# Patient Record
Sex: FEMALE | Race: WHITE | NOT HISPANIC OR LATINO | Employment: FULL TIME | URBAN - NONMETROPOLITAN AREA
[De-identification: names, ages, dates, MRNs, and addresses within clinical notes are randomized per-mention and may not be internally consistent; named-entity substitution may affect disease eponyms.]

---

## 2023-10-02 ENCOUNTER — OFFICE VISIT (OUTPATIENT)
Dept: OTOLARYNGOLOGY | Facility: CLINIC | Age: 53
End: 2023-10-02
Payer: COMMERCIAL

## 2023-10-02 VITALS — TEMPERATURE: 97.8 F | SYSTOLIC BLOOD PRESSURE: 129 MMHG | DIASTOLIC BLOOD PRESSURE: 66 MMHG | HEART RATE: 98 BPM

## 2023-10-02 DIAGNOSIS — H91.93 DECREASED HEARING OF BOTH EARS: ICD-10-CM

## 2023-10-02 DIAGNOSIS — H93.13 TINNITUS OF BOTH EARS: Primary | ICD-10-CM

## 2023-10-02 PROCEDURE — 99203 OFFICE O/P NEW LOW 30 MIN: CPT | Performed by: NURSE PRACTITIONER

## 2023-10-02 RX ORDER — ACYCLOVIR 200 MG/1
CAPSULE ORAL 2 TIMES DAILY
COMMUNITY

## 2023-10-02 RX ORDER — PHENTERMINE HYDROCHLORIDE 37.5 MG/1
TABLET ORAL
COMMUNITY
Start: 2023-02-08

## 2023-10-02 RX ORDER — PNV NO.95/FERROUS FUM/FOLIC AC 28MG-0.8MG
TABLET ORAL
COMMUNITY

## 2023-10-02 RX ORDER — DULOXETIN HYDROCHLORIDE 60 MG/1
CAPSULE, DELAYED RELEASE ORAL
COMMUNITY
Start: 2023-09-14

## 2023-10-02 RX ORDER — PANTOPRAZOLE SODIUM 40 MG/1
TABLET, DELAYED RELEASE ORAL
COMMUNITY
Start: 2023-09-13

## 2023-10-02 NOTE — PROGRESS NOTES
MARTY Atkinson  W ENT [facility] EAR NOSE & THROAT  [address], [address]  [place] [address]  Fax [phone]  Phone [phone]      Visit Type: NEW PATIENT   Chief Complaint   Patient presents with    Tinnitus     Ringing in ear started about a year ago.          DARWIN Tong is a 53 y.o. female who presents for evaluation of tinnitus.  Her symptoms are primarily localized to the left>right ears.  Her symptoms have been present for the last year.  The tinnitus is constant and worsening.  She describes the tinnitus as a humming sound.  She also has complaints of decreased hearing of both ears.  This has been present for at least the last year.  She denies any other otologic symptoms.    History of bruxism and TMJ.      Past Medical History:   Diagnosis Date    Anemia     Chronic GERD     Depression     Herpes     Weight loss        Past Surgical History:   Procedure Laterality Date    BUNIONECTOMY      COLONOSCOPY      ENDOSCOPY      GALLBLADDER SURGERY      MOUTH SURGERY      TUBAL ABDOMINAL LIGATION         Family History: Her family history includes Cancer in her father; Heart failure in her sister; Hypertension in her father and sister.     Social History: She  reports that she has never smoked. She has never used smokeless tobacco. She reports that she does not drink alcohol and does not use drugs.    Home Medications:  DULoxetine, acyclovir, ferrous sulfate, pantoprazole, and phentermine    Allergies:  She has No Known Allergies.       Vital Signs:   Temp:  [97.8 °F (36.6 °C)] 97.8 °F (36.6 °C)  Heart Rate:  [98] 98  BP: (129)/(66) 129/66  ENT Physical Exam  Constitutional  Appearance: patient appears well-developed, well-nourished and well-groomed, patient is cooperative;  Communication/Voice: communication appropriate for developmental age; vocal quality normal;  Head and Face  Appearance: head appears normal and face appears  atraumatic;  Ear  Auricles: right auricle normal; left auricle normal;  External Mastoids: right external mastoid normal; left external mastoid normal;  Ear Canals: right ear canal normal; left ear canal normal;  Tympanic Membranes: right tympanic membrane normal; left tympanic membrane normal;  Nose  External Nose: nares patent bilaterally;  Oral Cavity/Oropharynx  Lips: normal;  Neurovestibular  Mental Status: alert and oriented;  Psychiatric: mood normal; affect is appropriate;         Result Review    RESULTS REVIEW    I have reviewed the patients old records in the chart.    Assessment & Plan    Diagnoses and all orders for this visit:    1. Tinnitus of both ears (Primary)  -     Comprehensive Hearing Test; Future  -     Comprehensive Hearing Test; Future    2. Decreased hearing of both ears  -     Comprehensive Hearing Test; Future       We will follow-up with audiogram.  Tinnitus precautions and masking techniques.  She may try Lipoflavonoid vitamins.  Use hearing protection in loud noise situations.  She was instructed to call or return should any problems arise prior to next office visit.    Return for With Audio.      Electronically signed by MARTY Atkinson 10/02/23 1:47 PM CDT.

## 2023-10-19 ENCOUNTER — TRANSCRIBE ORDERS (OUTPATIENT)
Dept: ADMINISTRATIVE | Facility: HOSPITAL | Age: 53
End: 2023-10-19
Payer: COMMERCIAL

## 2023-10-19 DIAGNOSIS — R01.1 MURMUR: Primary | ICD-10-CM

## 2023-11-07 ENCOUNTER — HOSPITAL ENCOUNTER (OUTPATIENT)
Dept: CARDIOLOGY | Facility: HOSPITAL | Age: 53
Discharge: HOME | End: 2023-11-07
Payer: COMMERCIAL

## 2023-11-09 ENCOUNTER — PROCEDURE VISIT (OUTPATIENT)
Dept: OTOLARYNGOLOGY | Facility: CLINIC | Age: 53
End: 2023-11-09
Payer: COMMERCIAL

## 2023-11-09 ENCOUNTER — OFFICE VISIT (OUTPATIENT)
Dept: OTOLARYNGOLOGY | Facility: CLINIC | Age: 53
End: 2023-11-09
Payer: COMMERCIAL

## 2023-11-09 VITALS
SYSTOLIC BLOOD PRESSURE: 153 MMHG | WEIGHT: 146 LBS | HEART RATE: 71 BPM | DIASTOLIC BLOOD PRESSURE: 94 MMHG | TEMPERATURE: 97.3 F

## 2023-11-09 DIAGNOSIS — H90.3 SENSORINEURAL HEARING LOSS, BILATERAL: ICD-10-CM

## 2023-11-09 DIAGNOSIS — H93.13 TINNITUS, BILATERAL: ICD-10-CM

## 2023-11-09 DIAGNOSIS — H93.13 TINNITUS, BILATERAL: Primary | ICD-10-CM

## 2023-11-09 DIAGNOSIS — H90.3 SENSORINEURAL HEARING LOSS, BILATERAL: Primary | ICD-10-CM

## 2023-11-09 NOTE — PROGRESS NOTES
MARTY Oneill  NEERAJ ENT [facility] EAR NOSE & THROAT  [address], [address]  [place] [address]  Fax [phone]  Phone [phone]      Visit Type: FOLLOW UP   Chief Complaint   Patient presents with    Tinnitus     Follow up on Tinnitus with audio        HPI  She complains of decreased hearing and tinnitus. The symptoms are localized to both ears. The patient has had moderate symptoms. The symptoms have been relatively constant for the last year. There have been no identified factors that aggravate the symptoms. There have been no factors that have improved the symptoms.    Past Medical History:   Diagnosis Date    Anemia     Chronic GERD     Depression     Herpes     Weight loss        Past Surgical History:   Procedure Laterality Date    BUNIONECTOMY      COLONOSCOPY      ENDOSCOPY      GALLBLADDER SURGERY      MOUTH SURGERY      TUBAL ABDOMINAL LIGATION         Family History: Her family history includes Cancer in her father; Heart failure in her sister; Hypertension in her father and sister.     Social History: She  reports that she has never smoked. She has never used smokeless tobacco. She reports that she does not drink alcohol and does not use drugs.    Home Medications:  DULoxetine, acyclovir, ferrous sulfate, pantoprazole, and phentermine    Allergies:  She has No Known Allergies.       Vital Signs:   Temp:  [97.3 °F (36.3 °C)] 97.3 °F (36.3 °C)  Heart Rate:  [71] 71  BP: (153)/(94) 153/94  ENT Physical Exam  Ear  Hearing: intact;  Auricles: right auricle normal; left auricle normal;  External Mastoids: right external mastoid normal; left external mastoid normal;  Ear Canals: right ear canal normal; left ear canal normal;  Tympanic Membranes: right tympanic membrane normal; left tympanic membrane normal;           Result Review    RESULTS REVIEW    I have reviewed the patients old records in the chart.   The following results/records were  reviewed:   Procedure visit with Shelly Farr AUD (11/09/2023) SNHL bilaterally type A    Assessment & Plan    Diagnoses and all orders for this visit:    1. Tinnitus, bilateral (Primary)    2. Sensorineural hearing loss, bilateral       Use hearing protection in loud noise situations.  Consider hearing aid amplification.  Use home masking techniques- use low sound level of a pleasant sound like a fan or radio at night to drown out the tinnitus sound. If not working, can consider formal masking device through our hearing aid department.    Return if symptoms worsen or fail to improve.      Electronically signed by MARTY Oneill 11/09/23 3:18 PM CST.

## 2023-11-09 NOTE — PROGRESS NOTES
AUDIOMETRIC EVALUATION      Name:  Tahira Tong  :  1970  Age:  53 y.o.  Date of Evaluation:  2023       History:  Ms. Tong is seen today for a hearing evaluation due to decreased hearing and tinnitus at the request of MARTY Spring.    Audiologic Information:  Concerns for Hearing: bilateral  PETs: No  Other otologic surgical history: No  Aural Pressure/Fullness: No  Otalgia: No  Otorrhea: No  Tinnitus: constant bilateral humming; left>right  Dizziness: No  Noise Exposure: No  Family history of hearing loss: father  Head trauma requiring hospital stay: No  Chemotherapy: No  Other significant history: bruxism, TMJ, Herpes, CPAP use for sleep apnea    **Case history obtained in office and through EMR system      EVALUATION:        RESULTS:    Otoscopic Evaluation:  Right: minimal cerumen, tympanic membrane visualized  Left: minimal cerumen, tympanic membrane visualized    Tympanometry (226 Hz):  Right: Type A  Left: Type A    Pure Tone Audiometry:    Right: Mild (250Hz-2000Hz) sloping to severe sensorineural hearing loss   Left: Mild (250Hz-2000Hz) sloping to moderately severe sensorineural hearing loss     Speech Audiometry:   Right: Speech Reception Threshold (SRT) was obtained at 25 dB HL  Word Recognition scores - Excellent (100)% at 65 dB with 45 dB of contralateral masking, using NU-6 List 2A with 10 words  Left: Speech Reception Threshold (SRT) was obtained at 30 dB HL  Word Recognition scores - Excellent (100)% at 70 dB with 50 dB of contralateral masking, using NU-6 List 2A with 10 words  SRT/PTA in good agreement bilaterally.    IMPRESSIONS:  Tympanometry showed normal middle ear pressure and static compliance, consistent with normal middle ear function for both ears. Pure tone thresholds for both ears show sensorineural hearing loss, suggesting normal outer/middle ear function and abnormal cochlear/retrocochlear function. Patient was counseled with regard to the  findings.    Amplification needs:  Patient could benefit from amplification if they feel increased communication difficulties. Patient might have relief from their tinnitus with hearing aid use.    Diagnosis:  1. Sensorineural hearing loss, bilateral    2. Tinnitus, bilateral         RECOMMENDATIONS/PLAN:  Follow-up recommendations per MARTY Spring.  Practice good communication strategies to assist with everyday listening (eye contact with speakers, reduce background noise, encourage others to communicate clearly and slowly).  Tinnitus management strategies. Consider use of amplification, a white noise machine, low level TV/radio, or fan at night to help mask the tinnitus. It is also recommended to avoid caffeine, alcohol, tobacco, salt, high dose NSAIDs, and to increase hydration. Additional resources: Resound [employer] simran and American Tinn[employer] Association (www.tr.org).  Hearing aid evaluation and counseling upon medical clearance and patient motivation.   Repeat hearing evaluation if changes in hearing are noted or concerns arise.  Discussed results and recommendations with patient. Questions were addressed and the patient was encouraged to contact our department should concerns arise.        Shelly Joseph, CCC-A  Doctor of Audiology